# Patient Record
Sex: FEMALE | Race: OTHER
[De-identification: names, ages, dates, MRNs, and addresses within clinical notes are randomized per-mention and may not be internally consistent; named-entity substitution may affect disease eponyms.]

---

## 2020-07-17 ENCOUNTER — TRANSCRIPTION ENCOUNTER (OUTPATIENT)
Age: 35
End: 2020-07-17

## 2020-08-06 ENCOUNTER — TRANSCRIPTION ENCOUNTER (OUTPATIENT)
Age: 35
End: 2020-08-06

## 2022-06-17 PROBLEM — Z00.00 ENCOUNTER FOR PREVENTIVE HEALTH EXAMINATION: Status: ACTIVE | Noted: 2022-06-17

## 2022-07-26 LAB — SARS-COV-2 N GENE NPH QL NAA+PROBE: NOT DETECTED

## 2022-07-28 ENCOUNTER — APPOINTMENT (OUTPATIENT)
Dept: PULMONOLOGY | Facility: CLINIC | Age: 37
End: 2022-07-28

## 2022-07-28 VITALS
HEIGHT: 59 IN | OXYGEN SATURATION: 97 % | WEIGHT: 260 LBS | HEART RATE: 84 BPM | BODY MASS INDEX: 52.41 KG/M2 | DIASTOLIC BLOOD PRESSURE: 68 MMHG | TEMPERATURE: 98.4 F | SYSTOLIC BLOOD PRESSURE: 102 MMHG

## 2022-07-28 DIAGNOSIS — Z82.5 FAMILY HISTORY OF ASTHMA AND OTHER CHRONIC LOWER RESPIRATORY DISEASES: ICD-10-CM

## 2022-07-28 PROCEDURE — 99204 OFFICE O/P NEW MOD 45 MIN: CPT | Mod: 25

## 2022-07-28 PROCEDURE — 94010 BREATHING CAPACITY TEST: CPT

## 2022-07-28 PROCEDURE — 94726 PLETHYSMOGRAPHY LUNG VOLUMES: CPT

## 2022-07-28 PROCEDURE — 94729 DIFFUSING CAPACITY: CPT

## 2022-07-28 PROCEDURE — ZZZZZ: CPT

## 2022-07-28 RX ORDER — ALBUTEROL SULFATE 90 UG/1
108 (90 BASE) INHALANT RESPIRATORY (INHALATION)
Qty: 1 | Refills: 2 | Status: ACTIVE | COMMUNITY
Start: 2022-07-28 | End: 1900-01-01

## 2022-07-28 NOTE — ASSESSMENT
[FreeTextEntry1] : ATTENDING ATTESTATION\par \par 37 year old female here for initial pulmonary consult also endorsing  sleep symptoms. Pmhx significant for Morbid obesity and Asthma poorly controlled on Symbicort and Proair. \par \par Asthma \par - Normal PFT today with reduced ERV likely due to obesity\par - CXR ordered \par - Stop Symbicort\par - Start Trelegy samples provided Rx sent \par - Start Montelukast in evenings \par - Cont albuterol/duonebs Q4-6H PRN (discussed importance using as needed for acute symptoms) \par - CBC with diff, Asthma profile and Aspergillus abs ordered pt refused blood draw today will go to lab with script \par - Pending EOS results may be eligible for biologic \par \par R/O NERY \par - Sleep study ordered in lab SPLIT PSG/Titration \par - Discussed importance of weight loss and management for her overall health and well being, given weight management program contact information \par The patient has signs and symptoms suggestive of sleep disordered breathing. Therapeutic modalities were discussed with the patient and a SPLIT sleep study will be scheduled. Discussed with patient the rationale for treatment of NERY including improved quality of life, daytime function and to decrease cardiovascular risks that are associated with untreated NERY. The patient verbalized understanding\par \par Follow up upon completion of sleep study/testing.\par

## 2022-07-28 NOTE — PHYSICAL EXAM
[No Acute Distress] : no acute distress [Normal Oropharynx] : normal oropharynx [Enlarged Base of the Tongue] : enlarged base of the tongue [III] : Mallampati Class: III [2+] : Right Tonsil: 2+ [Normal Appearance] : normal appearance [Normal Rate/Rhythm] : normal rate/rhythm [Normal S1, S2] : normal s1, s2 [No Murmurs] : no murmurs [No Resp Distress] : no resp distress [No Acc Muscle Use] : no acc muscle use [Clear to Auscultation Bilaterally] : clear to auscultation bilaterally [Normal Bowel Sounds] : normal bowel sounds [Normal Gait] : normal gait [No Clubbing] : no clubbing [No Cyanosis] : no cyanosis [No Edema] : no edema [FROM] : FROM [Normal Color/ Pigmentation] : normal color/ pigmentation [No Focal Deficits] : no focal deficits [Oriented x3] : oriented x3 [Normal Affect] : normal affect [TextBox_2] : Obese adult female [TextBox_44] : large neck cirumfrence

## 2022-07-28 NOTE — END OF VISIT
[FreeTextEntry3] : I, Dr. Kimmie Savage personally performed the evaluation and management (E/M) services for this new patient.  That E/M includes conducting the initial examination, assessing all conditions, and establishing the plan of care.  Today, Windy Ann ACP was here to observe my evaluation and management services for this patient to be followed going forward.\par \par poorly controlled asthma, already on Trelegy. add singulair. check allergic asthma profile. Rule out NERY with split study. f/u in 3 months. [Time Spent: ___ minutes] : I have spent [unfilled] minutes of time on the encounter.

## 2022-07-28 NOTE — HISTORY OF PRESENT ILLNESS
[Awakes Unrefreshed] : awakes unrefreshed [Daytime Somnolence] : daytime somnolence [Difficulty Initiating Sleep] : difficulty initiating sleep [Difficulty Maintaining Sleep] : difficulty maintaining sleep [Fatigue] : fatigue [Frequent Nocturnal Awakening] : frequent nocturnal awakening [Nonrestorative Sleep] : nonrestorative sleep [Recent  Weight Gain] : recent weight gain [Tired while Driving] : tired while driving [Unintentional Sleep while Inactive] : unintentional sleep while inactive [DIS] : difficulty initiating sleep [DMS] : difficulty maintaining sleep [Snoring] : snoring [TextBox_4] : 37 year old female here for initial pulmonary consult also endorsing  sleep symptoms. Pmhx significant for Morbid obesity BMI 52 and Asthma on Symbicort and Proair. Asthma symptoms have worsened over the last month since URI having increased SOB and wheezing. PCP managed asthma since childhood. Currently using both Symbicort and Albuterol numerous times a day up to 6x each, has been on and off Prednisone 10mg-20mg a day self prescribing. Feels some relief with PO Steroids uses inhalers half as much on those days. Also using duonebs about 3 x a week. Prior to covid shutdown had exacerbation 4 x a year. Takes Claritin nightly. No history of intubations or hospitalizations. Sensitivity to Fluoroquinolones unsure which one had leg pains in past on it.\par \par  +Orthopnea, SOB/SALOMON, wheezing, coughing, chest tightness, swelling in feet/ankles improves with elevation. Breathing worse in humid hot weather/activity and better in AC. Has Cats believes she is also allergic to them. No recent CXRs. Denies CP, palps, fevers, chills, sinus congestion hx of nasal polyps. Sleeps on wedge pillow.\par Not recently allergy tested hx + for dander, mold, dust, pollen\par No personal Smoking Hx however mother smoked in house a child, Potential mold exposure in previous workplace\par \par Tried Advair in past did not recall benefit. \par Mother has COPD, Brothers asthma, Dad asthma \par \par Sleep: Thinks she may have sleep apnea, ESS 11. + frequent nighttime awakenings, snoring, weight gain, non-restorative sleep. 20lb weight gain since Dec. Tried Valerian root, melatonin, edibles and unisom with little help [Awakes with Dry Mouth] : does not awaken with dry mouth [Awakes with Headache] : does not awaken with headache [Hypnagogic Hallucinations] : denies hypnagogic hallucinations [Hypnopompic Hallucinations] : denies hypnopompic hallucinations [Sleep Paralysis] : no history of sleep paralysis [Unusual Movements] : no unusual movements [Witnessed Apneas] : no witnessed apneas [Unusual Sleep Behavior] : no unusual sleep behavior [TextBox_77] : 2 [TextBox_79] : 8/9 [TextBox_81] : 30-09v [TextBox_83] : 3-4x week [TextBox_85] : 5-6 [TextBox_89] : 3 [ESS] : 11

## 2022-08-05 ENCOUNTER — NON-APPOINTMENT (OUTPATIENT)
Age: 37
End: 2022-08-05

## 2022-08-09 ENCOUNTER — OUTPATIENT (OUTPATIENT)
Dept: OUTPATIENT SERVICES | Facility: HOSPITAL | Age: 37
LOS: 1 days | End: 2022-08-09
Payer: COMMERCIAL

## 2022-08-09 ENCOUNTER — APPOINTMENT (OUTPATIENT)
Dept: RADIOLOGY | Facility: CLINIC | Age: 37
End: 2022-08-09

## 2022-08-09 DIAGNOSIS — J45.909 UNSPECIFIED ASTHMA, UNCOMPLICATED: ICD-10-CM

## 2022-08-09 PROCEDURE — 71046 X-RAY EXAM CHEST 2 VIEWS: CPT | Mod: 26

## 2022-08-09 PROCEDURE — 71046 X-RAY EXAM CHEST 2 VIEWS: CPT

## 2022-10-18 ENCOUNTER — APPOINTMENT (OUTPATIENT)
Dept: SLEEP CENTER | Facility: CLINIC | Age: 37
End: 2022-10-18

## 2023-04-30 ENCOUNTER — APPOINTMENT (OUTPATIENT)
Dept: SLEEP CENTER | Facility: CLINIC | Age: 38
End: 2023-04-30

## 2023-08-25 ENCOUNTER — APPOINTMENT (OUTPATIENT)
Dept: PULMONOLOGY | Facility: CLINIC | Age: 38
End: 2023-08-25
Payer: COMMERCIAL

## 2023-08-25 VITALS
RESPIRATION RATE: 17 BRPM | WEIGHT: 245 LBS | HEIGHT: 59 IN | DIASTOLIC BLOOD PRESSURE: 70 MMHG | OXYGEN SATURATION: 99 % | BODY MASS INDEX: 49.39 KG/M2 | HEART RATE: 70 BPM | SYSTOLIC BLOOD PRESSURE: 112 MMHG

## 2023-08-25 DIAGNOSIS — G47.21 CIRCADIAN RHYTHM SLEEP DISORDER, DELAYED SLEEP PHASE TYPE: ICD-10-CM

## 2023-08-25 PROCEDURE — 99215 OFFICE O/P EST HI 40 MIN: CPT

## 2023-08-25 RX ORDER — MONTELUKAST 10 MG/1
10 TABLET, FILM COATED ORAL
Qty: 1 | Refills: 3 | Status: DISCONTINUED | COMMUNITY
Start: 2022-07-28 | End: 2023-08-25

## 2023-08-25 RX ORDER — BUDESONIDE, GLYCOPYRROLATE, AND FORMOTEROL FUMARATE 160; 9; 4.8 UG/1; UG/1; UG/1
160-9-4.8 AEROSOL, METERED RESPIRATORY (INHALATION)
Qty: 1 | Refills: 11 | Status: DISCONTINUED | COMMUNITY
Start: 2022-08-12 | End: 2023-08-25

## 2023-08-25 RX ORDER — NEFAZODONE HYDROCHLORIDE 50 MG/1
50 TABLET ORAL
Refills: 0 | Status: ACTIVE | COMMUNITY
Start: 2023-08-25

## 2023-08-25 RX ORDER — LORATADINE 5 MG/5 ML
10 SOLUTION, ORAL ORAL
Refills: 0 | Status: ACTIVE | COMMUNITY
Start: 2023-08-25

## 2023-08-25 NOTE — END OF VISIT
[FreeTextEntry3] : I, Dr. Kimmie Savage, personally performed the evaluation and management (E/M) services for this established patient who presents today with (a) new problem(s)/exacerbation of (an) existing condition(s).  That E/M includes conducting the examination, assessing all new/exacerbated conditions, and establishing a new plan of care.  Today, Ashley Muller ACP, was here to observe my evaluation and management services for this new problem/exacerbated condition to be followed going forward.  well controlled asthma, benefiting on Trelegy. New insurance, will order HST to evaluate for NERY. The patient has signs and symptoms suggestive of sleep disordered breathing. Therapeutic modalities were discussed with the patient and a sleep study will be scheduled. Follow up upon completion of sleep study. Pt with delayed circadian rhythm sleep disorder, contributed by poor sleep hygiene and lack of access to other spaces in the home. Pt anticipates moving to a new home and will be more easily spending time outside bedroom for non-sleep activities. Currently sleeping between 2 am - 10am, TST about 8 hours; I gave her sleep logs to fill out as scheduled does vary.  40 minutes time spent for patient education related to comorbidities and medications, medical records/labs/radiology reviews, preventative care, documentation, coordination of care.

## 2023-08-25 NOTE — PHYSICAL EXAM
[No Acute Distress] : no acute distress [Enlarged Base of the Tongue] : enlarged base of the tongue [III] : Mallampati Class: III [Normal Appearance] : normal appearance [Normal Rate/Rhythm] : normal rate/rhythm [Normal S1, S2] : normal s1, s2 [No Murmurs] : no murmurs [No Resp Distress] : no resp distress [Clear to Auscultation Bilaterally] : clear to auscultation bilaterally [No Abnormalities] : no abnormalities [Benign] : benign [Normal Gait] : normal gait [No Clubbing] : no clubbing [No Cyanosis] : no cyanosis [No Edema] : no edema [Normal Color/ Pigmentation] : normal color/ pigmentation [No Focal Deficits] : no focal deficits [Oriented x3] : oriented x3 [Normal Affect] : normal affect [TextBox_2] : obese female [TextBox_44] : wide neck

## 2023-08-25 NOTE — HISTORY OF PRESENT ILLNESS
[Never] : never [Awakes Unrefreshed] : awakes unrefreshed [Awakes with Dry Mouth] : awakes with dry mouth [Difficulty Initiating Sleep] : difficulty initiating sleep [Fatigue] : fatigue [Nonrestorative Sleep] : nonrestorative sleep [Snoring] : snoring [DIS] : difficulty initiating sleep [TextBox_4] : 38 year old female w/ Pmhx significant for Morbid obesity (BMI49) and Asthma who presents today for pulmonary follow up. She reports doing  significantly better since initiating Trelegy. States she has noticed improvement in exercise tolerance, ability to walk more, at a faster pace, go up and down stairs without becoming SOB and even ability to lay flat when sleeping.  Denies CP, chest tightness, wheeze, cough, mucus production.   Was sick once since she was last seen in 6/2023 and was taking trelegy, symbicort, duonebs and proair for 2-3 weeks to manage symptoms. Unsure if she was prescribed steroids at this time. Has tapered off and continues exclusively on Trelegy daily since July 2023.  Takes Claritin nightly. No history of intubations or hospitalizations. Sensitivity to Fluoroquinolones unsure which one had leg pains in past on it.  Has Cats, believes she is allergic to them. Not recently allergy tested hx + for dander, mold, dust, pollen No personal Smoking Hx however mother smoked in house a child, Potential mold exposure in previous workplace  Tried Advair in past did not recall benefit.  Mother has COPD, Brothers asthma, Dad asthma   Sleep: Patient was unable to complete sleep study due to financial constraints. Reports improved sleep however, still with snoring, non restorative sleep, dry mouth.  No s/s parasomnias Sleep Schedule: Goes to bed around 2-4am, wakes up 1-2 x to void, no difficulty falling back to sleep. Wakes up around 12:30 - 1 pm. Feels unrefreshed upon awakening. NO AM headaches.  Naps/ daytime sleep: none    Nefazodone for anxiety depression - started Spring 2023  and has noticed improvement in sleep. Taking edibles nightly, also helps her fall asleep.  [Awakes with Headache] : does not awaken with headache [Daytime Somnolence] : denies daytime somnolence [Difficulty Maintaining Sleep] : does not have difficulty maintaining sleep [Frequent Nocturnal Awakening] : denies frequent nocturnal awakening [Hypnagogic Hallucinations] : denies hypnagogic hallucinations [Hypnopompic Hallucinations] : denies hypnopompic hallucinations [Recent  Weight Gain] : no recent weight gain [Sleep Paralysis] : no history of sleep paralysis [Tired while Driving] : not tired while driving [Unintentional Sleep while Active] : no unintentional sleep while active [Unintentional Sleep while Inactive] : no unintentional sleep while inactive [Unusual Movements] : no unusual movements [Witnessed Apneas] : no witnessed apneas [DMS] : does not have difficulty maintaining sleep [Unusual Sleep Behavior] : no unusual sleep behavior [ESS] : 11

## 2023-08-25 NOTE — REVIEW OF SYSTEMS
[Fatigue] : fatigue [Recent Wt Loss (___ Lbs)] : ~T recent [unfilled] lb weight loss [Dry Mouth] : dry mouth [Arthralgias] : arthralgias [Depression] : depression [Anxiety] : anxiety [Obesity] : obesity [Negative] : Neurologic [TextBox_94] : knee pain

## 2023-08-25 NOTE — ASSESSMENT
[FreeTextEntry1] : ATTENDING ATTESTATION  This is a 37yo F w/ a PMHx of morbid obesity, anxiety/depression and asthma maintained on Trelegy.   Asthma  - Continue Trelegy daily, rinse mouth after use.  - Refills sent.  - Continue Claritin daily.  - PFT previously completed with reduced ERV likely due to obesity. - Cont albuterol/duonebs Q4-6H PRN (discussed importance using as needed for acute symptoms)   R/O NERY --- ESS today 1  - Reinforced importance of ruling out NERY. Discussed with patient the rationale for treatment of NERY including improved quality of life, daytime function and to decrease cardiovascular risks that are associated with untreated NERY. The patient verbalized understanding. - Will order HST - hopefully will be more affordable for patient.  - Discussed importance of continued weight loss and management for her overall health and well-being. Commended patient on 15lb weight loss.   Follow up upon completion of sleep study/testing.

## 2023-11-02 ENCOUNTER — RX RENEWAL (OUTPATIENT)
Age: 38
End: 2023-11-02

## 2024-03-14 ENCOUNTER — APPOINTMENT (OUTPATIENT)
Dept: PULMONOLOGY | Facility: CLINIC | Age: 39
End: 2024-03-14

## 2024-04-05 ENCOUNTER — LABORATORY RESULT (OUTPATIENT)
Age: 39
End: 2024-04-05

## 2024-04-05 ENCOUNTER — APPOINTMENT (OUTPATIENT)
Dept: PULMONOLOGY | Facility: CLINIC | Age: 39
End: 2024-04-05
Payer: COMMERCIAL

## 2024-04-05 VITALS
HEART RATE: 65 BPM | SYSTOLIC BLOOD PRESSURE: 126 MMHG | WEIGHT: 224 LBS | RESPIRATION RATE: 16 BRPM | BODY MASS INDEX: 45.16 KG/M2 | OXYGEN SATURATION: 98 % | DIASTOLIC BLOOD PRESSURE: 84 MMHG | HEIGHT: 59 IN

## 2024-04-05 DIAGNOSIS — E66.01 MORBID (SEVERE) OBESITY DUE TO EXCESS CALORIES: ICD-10-CM

## 2024-04-05 DIAGNOSIS — J45.909 UNSPECIFIED ASTHMA, UNCOMPLICATED: ICD-10-CM

## 2024-04-05 DIAGNOSIS — R06.83 SNORING: ICD-10-CM

## 2024-04-05 PROCEDURE — 99215 OFFICE O/P EST HI 40 MIN: CPT

## 2024-04-05 RX ORDER — FLUTICASONE FUROATE, UMECLIDINIUM BROMIDE AND VILANTEROL TRIFENATATE 200; 62.5; 25 UG/1; UG/1; UG/1
200-62.5-25 POWDER RESPIRATORY (INHALATION)
Qty: 1 | Refills: 5 | Status: ACTIVE | COMMUNITY
Start: 2022-07-28 | End: 1900-01-01

## 2024-04-05 RX ORDER — AZITHROMYCIN 250 MG/1
250 TABLET, FILM COATED ORAL
Qty: 1 | Refills: 0 | Status: ACTIVE | COMMUNITY
Start: 2024-04-05 | End: 1900-01-01

## 2024-04-05 RX ORDER — METHYLPREDNISOLONE 4 MG/1
4 TABLET ORAL
Qty: 1 | Refills: 0 | Status: ACTIVE | COMMUNITY
Start: 2024-04-05 | End: 1900-01-01

## 2024-04-05 NOTE — ASSESSMENT
[FreeTextEntry1] : ATTENDING ATTESTATION  This is a 37yo F w/ a PMHx of morbid obesity, anxiety/depression and asthma maintained on Trelegy. Recently exacerbating more often since moving to new residence in Nov.   Asthma  - Start Zpack  - Start Medrol pack (will avoid Higher dose steroids as pt's Nefazodone can increase steroid levels and Prednisone can decrease drug level as well) - Increased Trelegy to 200 daily, rinse mouth after use.  - Sample of Airsupra for PRN provided - Continue Claritin daily.  - Start Montelukast QPM.  - PFT previously completed with reduced ERV likely due to obesity. - Cont albuterol/duonebs Q4-6H PRN - Ordered new nebulizer -Labs today ABPA workup, Allergy panel today  R/O NERY --- ESS 1  - Reinforced importance of ruling out NERY. Discussed with patient the rationale for treatment of NERY including improved quality of life, daytime function and to decrease cardiovascular risks that are associated with untreated NERY. The patient verbalized understanding. - Reorder HST - hopefully will be more affordable for patient.  - Provided sleep log again to be done for 3 weeks.   F/u in 3-4 months with IVETTE

## 2024-04-05 NOTE — PHYSICAL EXAM
[No Acute Distress] : no acute distress [Enlarged Base of the Tongue] : enlarged base of the tongue [III] : Mallampati Class: III [Normal Appearance] : normal appearance [Normal Rate/Rhythm] : normal rate/rhythm [Normal S1, S2] : normal s1, s2 [No Murmurs] : no murmurs [No Resp Distress] : no resp distress [Clear to Auscultation Bilaterally] : clear to auscultation bilaterally [No Abnormalities] : no abnormalities [Benign] : benign [Normal Gait] : normal gait [No Clubbing] : no clubbing [No Cyanosis] : no cyanosis [No Edema] : no edema [Normal Color/ Pigmentation] : normal color/ pigmentation [No Focal Deficits] : no focal deficits [Oriented x3] : oriented x3 [Normal Affect] : normal affect [Normal Oropharynx] : normal oropharynx [TextBox_2] : obese female [TextBox_44] : wide neck

## 2024-04-05 NOTE — END OF VISIT
[FreeTextEntry3] : I, Dr. Kimmie Savage, personally performed the evaluation and management (E/M) services for this established patient who presents today with (a) new problem(s)/exacerbation of (an) existing condition(s).  That E/M includes conducting the examination, assessing all new/exacerbated conditions, and establishing a new plan of care.  Today, Windy Ann ACP, was here to observe my evaluation and management services for this new problem/exacerbated condition to be followed going forward.  Pt with persistent asthma exacerbation, increase Trelegy, start z pack, medrol-dose-pack; allergy labs drawn. New insurance, will order HST to evaluate for NERY. The patient has signs and symptoms suggestive of sleep disordered breathing. Therapeutic modalities were discussed with the patient and a sleep study will be scheduled. Follow up upon completion of sleep study. Pt with delayed circadian rhythm sleep disorder, contributed by poor sleep hygiene and lack of access to other spaces in the home. Hx of sleeping between 2 am - 10am, TST about 8 hours; I gave her sleep logs to fill out as scheduled does vary. F/u in 3 mos with spirometry.  45 minutes time spent for patient education related to comorbidities and medications, medical records/labs/radiology reviews, preventative care, documentation, coordination of care.

## 2024-04-05 NOTE — HISTORY OF PRESENT ILLNESS
[Never] : never [Awakes Unrefreshed] : awakes unrefreshed [Awakes with Dry Mouth] : awakes with dry mouth [Difficulty Initiating Sleep] : difficulty initiating sleep [Fatigue] : fatigue [Nonrestorative Sleep] : nonrestorative sleep [Snoring] : snoring [DIS] : difficulty initiating sleep [TextBox_4] : 39 year old female w/ Pmhx significant for Morbid obesity (BMI45), anxiety and Asthma.   Here today for follow up was doing well on Trelegy infrequently exacerbating until she moved to new residence in Nov. Since then she has been using her albuterol 1-2x a day and duonebs 1-2 x a day. Had strep in March was treating with Amoxicillin and Prednisone taper (breathing was improved when on oral steroids). Used moms sample of Advair when she didn't have trelegy. + cough, sputum yellow, chest tightness, wheeze. No fevers, chills, sob, kaur, sinus pressure/congestion, nasal polyps, rashes, swelling. Did not do sleep study or sleep log.   Has Cats, believes she is allergic to them. Not recently allergy tested hx + for dander, mold, dust, pollen No personal Smoking Hx however mother smoked in house a child, Potential mold exposure in previous workplace  Tried Advair in past did not recall benefit.  Mother has COPD, Brothers asthma, Dad asthma   Sleep: Patient was still unable to complete sleep study due to financial constraints. Reports improved sleep however, still with snoring, non restorative sleep, dry mouth.  No s/s parasomnias Sleep Schedule: Goes to bed around 2-4am, wakes up 1-2 x to void, no difficulty falling back to sleep. Wakes up around 12:30 - 1 pm. Feels unrefreshed upon awakening. NO AM headaches.  Naps/ daytime sleep: none    Nefazodone for anxiety depression - started Spring 2023  and has noticed improvement in sleep. Taking edibles nightly, also helps her fall asleep.  [Awakes with Headache] : does not awaken with headache [Daytime Somnolence] : denies daytime somnolence [Difficulty Maintaining Sleep] : does not have difficulty maintaining sleep [Frequent Nocturnal Awakening] : denies frequent nocturnal awakening [Hypnagogic Hallucinations] : denies hypnagogic hallucinations [Hypnopompic Hallucinations] : denies hypnopompic hallucinations [Recent  Weight Gain] : no recent weight gain [Sleep Paralysis] : no history of sleep paralysis [Tired while Driving] : not tired while driving [Unintentional Sleep while Active] : no unintentional sleep while active [Unintentional Sleep while Inactive] : no unintentional sleep while inactive [Unusual Movements] : no unusual movements [Witnessed Apneas] : no witnessed apneas [DMS] : does not have difficulty maintaining sleep [Unusual Sleep Behavior] : no unusual sleep behavior [ESS] : 11

## 2024-04-25 ENCOUNTER — NON-APPOINTMENT (OUTPATIENT)
Age: 39
End: 2024-04-25

## 2024-05-13 ENCOUNTER — NON-APPOINTMENT (OUTPATIENT)
Age: 39
End: 2024-05-13

## 2024-05-13 LAB
A FLAVUS AB FLD QL: NEGATIVE
A FUMIGATUS AB FLD QL: NEGATIVE
A NIGER AB FLD QL: NEGATIVE
BASOPHILS # BLD AUTO: 0.08 K/UL
BASOPHILS NFR BLD AUTO: 0.7 %
EOSINOPHIL # BLD AUTO: 0.15 K/UL
EOSINOPHIL NFR BLD AUTO: 1.3 %
HCT VFR BLD CALC: 41.4 %
HGB BLD-MCNC: 13.1 G/DL
IMM GRANULOCYTES NFR BLD AUTO: 0.5 %
LYMPHOCYTES # BLD AUTO: 3.4 K/UL
LYMPHOCYTES NFR BLD AUTO: 29.2 %
MAN DIFF?: NORMAL
MCHC RBC-ENTMCNC: 28.6 PG
MCHC RBC-ENTMCNC: 31.6 GM/DL
MCV RBC AUTO: 90.4 FL
MONOCYTES # BLD AUTO: 0.88 K/UL
MONOCYTES NFR BLD AUTO: 7.6 %
NEUTROPHILS # BLD AUTO: 7.08 K/UL
NEUTROPHILS NFR BLD AUTO: 60.7 %
PLATELET # BLD AUTO: 281 K/UL
RBC # BLD: 4.58 M/UL
RBC # FLD: 15.7 %
TOTAL IGE SMQN RAST: 157 KU/L
WBC # FLD AUTO: 11.65 K/UL

## 2024-05-15 ENCOUNTER — TRANSCRIPTION ENCOUNTER (OUTPATIENT)
Age: 39
End: 2024-05-15

## 2024-07-17 ENCOUNTER — RESULT CHARGE (OUTPATIENT)
Age: 39
End: 2024-07-17

## 2024-07-18 ENCOUNTER — APPOINTMENT (OUTPATIENT)
Dept: PULMONOLOGY | Facility: CLINIC | Age: 39
End: 2024-07-18
Payer: COMMERCIAL

## 2024-07-18 VITALS
BODY MASS INDEX: 43.55 KG/M2 | OXYGEN SATURATION: 98 % | SYSTOLIC BLOOD PRESSURE: 114 MMHG | HEART RATE: 75 BPM | WEIGHT: 216 LBS | HEIGHT: 59 IN | DIASTOLIC BLOOD PRESSURE: 83 MMHG

## 2024-07-18 DIAGNOSIS — J45.909 UNSPECIFIED ASTHMA, UNCOMPLICATED: ICD-10-CM

## 2024-07-18 DIAGNOSIS — R06.83 SNORING: ICD-10-CM

## 2024-07-18 PROCEDURE — 99214 OFFICE O/P EST MOD 30 MIN: CPT | Mod: 25

## 2024-07-18 PROCEDURE — 94010 BREATHING CAPACITY TEST: CPT

## 2024-07-18 PROCEDURE — ZZZZZ: CPT

## 2024-07-18 RX ORDER — MONTELUKAST 10 MG/1
10 TABLET, FILM COATED ORAL
Qty: 1 | Refills: 1 | Status: ACTIVE | COMMUNITY
Start: 2024-07-18 | End: 1900-01-01

## 2024-07-18 RX ORDER — ALBUTEROL SULFATE AND BUDESONIDE 90; 80 UG/1; UG/1
90-80 AEROSOL, METERED RESPIRATORY (INHALATION)
Qty: 1 | Refills: 2 | Status: ACTIVE | COMMUNITY
Start: 2024-07-18 | End: 1900-01-01

## 2024-11-21 ENCOUNTER — NON-APPOINTMENT (OUTPATIENT)
Age: 39
End: 2024-11-21

## 2025-01-23 ENCOUNTER — APPOINTMENT (OUTPATIENT)
Dept: PULMONOLOGY | Facility: CLINIC | Age: 40
End: 2025-01-23
Payer: COMMERCIAL

## 2025-01-23 VITALS
HEART RATE: 74 BPM | SYSTOLIC BLOOD PRESSURE: 116 MMHG | OXYGEN SATURATION: 98 % | BODY MASS INDEX: 42.33 KG/M2 | RESPIRATION RATE: 16 BRPM | TEMPERATURE: 97.8 F | HEIGHT: 59 IN | WEIGHT: 210 LBS | DIASTOLIC BLOOD PRESSURE: 79 MMHG

## 2025-01-23 DIAGNOSIS — J45.909 UNSPECIFIED ASTHMA, UNCOMPLICATED: ICD-10-CM

## 2025-01-23 DIAGNOSIS — R06.83 SNORING: ICD-10-CM

## 2025-01-23 DIAGNOSIS — J45.901 UNSPECIFIED ASTHMA WITH (ACUTE) EXACERBATION: ICD-10-CM

## 2025-01-23 PROCEDURE — 99215 OFFICE O/P EST HI 40 MIN: CPT

## 2025-01-23 PROCEDURE — G2211 COMPLEX E/M VISIT ADD ON: CPT

## 2025-01-23 RX ORDER — FLUTICASONE PROPIONATE 50 UG/1
50 SPRAY, METERED NASAL TWICE DAILY
Qty: 1 | Refills: 0 | Status: ACTIVE | COMMUNITY
Start: 2025-01-23 | End: 1900-01-01

## 2025-01-23 RX ORDER — PREDNISONE 20 MG/1
20 TABLET ORAL DAILY
Qty: 5 | Refills: 0 | Status: ACTIVE | COMMUNITY
Start: 2025-01-23 | End: 1900-01-01

## 2025-01-23 RX ORDER — PSEUDOEPHEDRINE HCL 30 MG
27-0.8 TABLET ORAL
Refills: 0 | Status: ACTIVE | COMMUNITY
Start: 2025-01-23

## 2025-01-24 LAB
RESP PATH DNA+RNA PNL NPH NAA+NON-PROBE: NOT DETECTED
SARS-COV-2 RNA RESP QL NAA+PROBE: NOT DETECTED

## 2025-02-05 RX ORDER — IPRATROPIUM BROMIDE AND ALBUTEROL SULFATE 2.5; .5 MG/3ML; MG/3ML
0.5-2.5 (3) SOLUTION RESPIRATORY (INHALATION)
Qty: 1 | Refills: 3 | Status: ACTIVE | COMMUNITY
Start: 2025-02-05 | End: 1900-01-01

## 2025-02-05 RX ORDER — PREDNISONE 10 MG/1
10 TABLET ORAL
Qty: 18 | Refills: 0 | Status: ACTIVE | COMMUNITY
Start: 2025-02-05 | End: 1900-01-01

## 2025-02-05 RX ORDER — AZITHROMYCIN 250 MG/1
250 TABLET, FILM COATED ORAL
Qty: 1 | Refills: 0 | Status: ACTIVE | COMMUNITY
Start: 2025-02-05 | End: 1900-01-01